# Patient Record
Sex: FEMALE | Race: WHITE | NOT HISPANIC OR LATINO | URBAN - METROPOLITAN AREA
[De-identification: names, ages, dates, MRNs, and addresses within clinical notes are randomized per-mention and may not be internally consistent; named-entity substitution may affect disease eponyms.]

---

## 2017-12-23 ENCOUNTER — EMERGENCY (EMERGENCY)
Facility: HOSPITAL | Age: 82
LOS: 1 days | Discharge: HOME | End: 2017-12-23
Attending: EMERGENCY MEDICINE | Admitting: EMERGENCY MEDICINE
Payer: MEDICARE

## 2017-12-23 VITALS
HEART RATE: 79 BPM | DIASTOLIC BLOOD PRESSURE: 95 MMHG | SYSTOLIC BLOOD PRESSURE: 202 MMHG | RESPIRATION RATE: 18 BRPM | TEMPERATURE: 98 F | OXYGEN SATURATION: 97 %

## 2017-12-23 VITALS
OXYGEN SATURATION: 100 % | RESPIRATION RATE: 18 BRPM | HEART RATE: 69 BPM | DIASTOLIC BLOOD PRESSURE: 94 MMHG | SYSTOLIC BLOOD PRESSURE: 164 MMHG | TEMPERATURE: 98 F

## 2017-12-23 DIAGNOSIS — Z88.4 ALLERGY STATUS TO ANESTHETIC AGENT: ICD-10-CM
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
## 2017-12-23 DIAGNOSIS — M79.645 PAIN IN LEFT FINGER(S): ICD-10-CM

## 2017-12-23 DIAGNOSIS — Z88.5 ALLERGY STATUS TO NARCOTIC AGENT: ICD-10-CM

## 2017-12-23 DIAGNOSIS — E78.5 HYPERLIPIDEMIA, UNSPECIFIED: ICD-10-CM

## 2017-12-23 DIAGNOSIS — Z88.2 ALLERGY STATUS TO SULFONAMIDES: ICD-10-CM

## 2017-12-23 DIAGNOSIS — S62.617A DISPLACED FRACTURE OF PROXIMAL PHALANX OF LEFT LITTLE FINGER, INITIAL ENCOUNTER FOR CLOSED FRACTURE: ICD-10-CM

## 2017-12-23 PROCEDURE — 73560 X-RAY EXAM OF KNEE 1 OR 2: CPT | Mod: 26,LT

## 2017-12-23 PROCEDURE — 99284 EMERGENCY DEPT VISIT MOD MDM: CPT | Mod: 57,25

## 2017-12-23 PROCEDURE — 73562 X-RAY EXAM OF KNEE 3: CPT | Mod: 26,LT

## 2017-12-23 PROCEDURE — 73140 X-RAY EXAM OF FINGER(S): CPT | Mod: 26,LT

## 2017-12-23 PROCEDURE — 73140 X-RAY EXAM OF FINGER(S): CPT | Mod: 26,52,59,LT

## 2017-12-23 PROCEDURE — 73130 X-RAY EXAM OF HAND: CPT | Mod: 26,LT

## 2017-12-23 PROCEDURE — 26725 TREAT FINGER FRACTURE EACH: CPT | Mod: 54,LT

## 2017-12-23 RX ORDER — ACETAMINOPHEN 500 MG
650 TABLET ORAL ONCE
Qty: 0 | Refills: 0 | Status: COMPLETED | OUTPATIENT
Start: 2017-12-23 | End: 2017-12-23

## 2017-12-23 RX ADMIN — Medication 650 MILLIGRAM(S): at 19:22

## 2017-12-23 NOTE — ED PROCEDURE NOTE - NS ED PERI VASCULAR NEG
no paresthesia/fingers/toes warm to touch/no swelling/no cyanosis of extremity/capillary refill time < 2 seconds

## 2017-12-23 NOTE — ED PROVIDER NOTE - CARE PLAN
Principal Discharge DX:	Closed displaced fracture of proximal phalanx of left little finger, initial encounter

## 2017-12-23 NOTE — ED PROVIDER NOTE - NS_EDPROVIDERDISPOUSERTYPE_ED_A_ED
Scribe Attestation (For Scribes USE Only)... Scribe Attestation (For Scribes USE Only).../I have personally evaluated and examined the patient. The Attending was available to me as a supervising provider if needed. I have personally evaluated and examined the patient. The Attending was available to me as a supervising provider if needed./Scribe Attestation (For Scribes USE Only).../Attending Attestation (For Attendings USE Only)... Scribe Attestation (For Scribes USE Only).../Attending Attestation (For Attendings USE Only)...

## 2017-12-23 NOTE — ED PROVIDER NOTE - OBJECTIVE STATEMENT
81 y/o F presents s/p mechanical fall. Patient was rushing down the stairs heading to a friend's birthday party when she tripped and fell down a few stairs. No head injury or loss of consciousness. Able to get up and ambulate. Complains of L 5th digit pain w/ obvious deformity and abrasion to L knee. Denies headache, neck pain, back pain, chest pain, abdominal pain, nausea, vomiting, weakness, numbness/tingling, or any other injuries. 81 y/o F w/ PMH HLD, not on blood thinners, presents s/p mechanical fall. Patient was rushing down the stairs heading to a friend's birthday party when she tripped and fell down a few stairs. No head injury or loss of consciousness. Able to get up and ambulate. Complains of L 5th digit pain w/ obvious deformity and abrasion to L knee. Denies headache, neck pain, back pain, chest pain, abdominal pain, nausea, vomiting, weakness, numbness/tingling, or any other injuries. 83 y/o F w/ PMH HLD, not on blood thinners, presents s/p mechanical fall. Patient was rushing down the stairs heading to a friend's birthday party when she tripped and fell down a few stairs. No head injury or loss of consciousness. Able to get up and ambulate. Complains of L 5th digit pain w/ obvious deformity and abrasion to L knee. Denies headache, neck pain, back pain, chest pain, abdominal pain, nausea, vomiting, weakness, numbness/tingling, or any other injuries. Patient advised to remove ring finger but refused to remove the ring as her  passed away 1 month ago.

## 2017-12-23 NOTE — ED PROVIDER NOTE - DIAGNOSTIC INTERPRETATION
Interpreted by HEATH RODRIGUEZ 5th digit x-ray, 3 views  L 5th digit w/ displaced fracture to proximal phalange not involving joint space. Patient refused to remove ring from 4th digit which is see non XR, however there is no edema or trauma to the digit. No FB.     Interpreted by HEATH RODRIGUEZ knee x-ray, 3 views  No fracture, no dislocation (joint spaces grossly normal), no Foreign Body noted, soft tissue normal

## 2017-12-23 NOTE — ED PROVIDER NOTE - PROGRESS NOTE DETAILS
reduced fracture- post redox shows aligned fracture. splint placed. will see ortho surgery next week